# Patient Record
Sex: FEMALE | Race: WHITE | Employment: FULL TIME | URBAN - METROPOLITAN AREA
[De-identification: names, ages, dates, MRNs, and addresses within clinical notes are randomized per-mention and may not be internally consistent; named-entity substitution may affect disease eponyms.]

---

## 2020-01-21 ENCOUNTER — APPOINTMENT (OUTPATIENT)
Dept: PHYSICAL THERAPY | Age: 50
End: 2020-01-21

## 2020-01-24 ENCOUNTER — APPOINTMENT (OUTPATIENT)
Dept: PHYSICAL THERAPY | Age: 50
End: 2020-01-24

## 2020-01-27 ENCOUNTER — APPOINTMENT (OUTPATIENT)
Dept: PHYSICAL THERAPY | Age: 50
End: 2020-01-27

## 2020-01-29 ENCOUNTER — APPOINTMENT (OUTPATIENT)
Dept: PHYSICAL THERAPY | Age: 50
End: 2020-01-29

## 2020-02-03 ENCOUNTER — HOSPITAL ENCOUNTER (OUTPATIENT)
Dept: PHYSICAL THERAPY | Age: 50
Discharge: HOME | End: 2020-02-03
Payer: COMMERCIAL

## 2020-02-03 PROCEDURE — 97161 PT EVAL LOW COMPLEX 20 MIN: CPT

## 2020-02-03 PROCEDURE — 97140 MANUAL THERAPY 1/> REGIONS: CPT

## 2020-02-03 NOTE — THERAPY EVALUATION
Gaurav Montalvo  : 1970  Payor: [employer], [address] [place] [address] / Plan: [place] [employer] / Product Type: Commerical /  [place] at [facility] Therapy  [address], [address]  [phone]   [phone]         OUTPATIENT PHYSICAL [place] Assessment 2/3/2020   ICD-10: Treatment Diagnosis: right shoulder pain M25.511, Right elbow pain M25.521, Unspecified injury of right shoulder and upper arm, subsequent encounter [S49.91XD]  Precautions/Allergies:   Patient has no allergy information on record. TREATMENT PLAN:  Effective Dates: 2/3/2020 TO 5/3/2020 (90 days). Frequency/Duration: 2 times a week for 90 Day(s) and upon reassessment, will adjust frequency and duration as progress indicates. MEDICAL/REFERRING DIAGNOSIS:  Unspecified injury of right shoulder and upper arm, subsequent encounter [S49.91XD]   DATE OF ONSET: surgery 2020  REFERRING PHYSICIAN: Les Eubanks MD MD Orders: Eval and treat per protocol in paper chart  Return MD Appointment: unknown at this time     INITIAL ASSESSMENT:  Ms. Sal Kim presents status post right shoulder surgery on 2020. She reported originally injuring the shoulder in July as she fell of sidewalk. She attempted to treat the arm at home and then with formal physical therapy and injections. The shoulder did not improve and she underwent x-ray and MRI towards end of November. She was diagnosed with fracture, biceps tear, labral tear and assuming a cuff tear although pt was not very clear on the cuff tear. She underwent surgery on 2020 and is now ready to begin therapy. She is in sling until 2020. She presented today with mild soreness and loss of passive and active motion of the shoulder. PROBLEM LIST (Impacting functional limitations):  1. Decreased Strength  2. Decreased ADL/Functional Activities  3. Increased Pain  4.  Decreased Flexibility/Joint Mobility INTERVENTIONS PLANNED: (Treatment may consist of any combination of the following)  1. Cold  2. Home Exercise Program (HEP)  3. Manual Therapy  4. Range of Motion (ROM)  5. Therapeutic Exercise/Strengthening     GOALS: (Goals have been discussed and agreed upon with patient.)  Short-Term Functional Goals: Time Frame: 6 weeks  1. Establish independent HEP with no cuing. 2. Pt will be able to d/c sling. 3. Pt will be able to sleep entire night in her bed. 4. Pt will be able to return to work. Discharge Goals: Time Frame: 12 weeks (90 days)  1. Pt will be able to improve score on DASH by at least 6 points for advanced ADL's.  2. Pt will be able to gain full active elbow and shoulder ROM for reaching. 3. Pt will be able to gain 4/5 to 4+/5 strength for lifting groceries and all household objects with arm. OUTCOME MEASURE:   Tool Used: Disabilities of the Arm, Shoulder and Hand (DASH) Questionnaire - Quick Version  Score:  Initial: 30/55  Most Recent: X/55 (Date: -- )   Interpretation of Score: The DASH is designed to measure the activities of daily living in person's with upper extremity dysfunction or pain. Each section is scored on a 1-5 scale, 5 representing the greatest disability. The scores of each section are added together for a total score of 55. MEDICAL NECESSITY:   · Patient is expected to demonstrate progress in strength and range of motion to improve her overall ability to  use the right arm for reaching and lifitng and pushing of vacuum. .  REASON FOR SERVICES/OTHER COMMENTS:  · Patient continues to require skilled intervention due to lack of full elbow and shoulder ROM and strength needed for reaching, lifiting, grooming and household chores. Total Duration:  PT Patient Time In/Time Out  Time In: 0950  Time Out: 1040    Rehabilitation Potential For Stated Goals: Good  Regarding Marci Villgeas's therapy, I certify that the treatment plan above will be carried out by a therapist or under their direction.   Thank you for this referral,  Isrrael Frazier, PT     Referring Physician Signature: Razia Pascual MD No Signature is Required for this note. PAIN/SUBJECTIVE:   Initial: Pain Intensity 1: 2  Post Session:  2/10   HISTORY:   History of Injury/Illness (Reason for Referral):  Ms. Lucy Wakefield presents status post right shoulder surgery on 1/8/2020. She reported originally injuring the shoulder in July as she fell of sidewalk. She attempted to treat the arm at home and then with formal physical therapy and injections. The shoulder did not improve and she underwent x-ray and MRI towards end of November. She was diagnosed with fracture, biceps tear, labral tear and assuming a cuff tear although pt was not very clear on the cuff tear. She underwent surgery on 1/8/2020 and is now ready to begin therapy. She is in sling until 2/8/2020. She presented today with mild soreness and loss of passive and active motion of the shoulder. Past Medical History/Comorbidities:   Ms. Lucy Wakefield  has no past medical history on file. Ms. Lucy Wakefield  has no past surgical history on file.gallbladder removal, carpal tunnel surgery  Social History/Living Environment:     pt lives alone- roughly 3 years ago  Prior Level of Function/Work/Activity:  Pt works at computer during the day in [address] [place] [address] Street Side:         RIGHT   Ambulatory/Rehab [facility] Assessment   Risk Factors:       No Risk Factors Identified Ability to Rise from Chair:       (0)  Ability to rise in a single movement   Falls Prevention Plan:       No modifications necessary   Total: (5 or greater = High Risk): 0   ©2010 [facility] [address] [place] [address] Patent #[id number]. Federal Law prohibits the replication, distribution or use without written permission from [facility]   Current Medications:     No current outpatient medications on file.    Date Last Reviewed:  2/3/2020   Number of Personal Factors/Comorbidities that affect the Plan of Care: 1-2: MODERATE COMPLEXITY        EXAMINATION:   Palpation:          Raised lateral portal on shoulder-all incisions are healed  ROM:     R UE -PROM    Flex 140 °  abd 100 °  ER 30 °  IR 56 °    Passive elbow motion - °  Pronation WFL  Supination 40 °                    L UE-AROM  Flex 146 °  abd 160 °  ER 50 °  IR 75 °    Active elbow motion 0-146 °  Pronation WFL  Supination 60 °           Strength:     R UE deferred MMT due to surgery         L UE 4+/5            Special Tests: na  Neurological Screen: na  Balance:  good      Body Structures Involved:  1. Bones  2. Joints  3. Muscles  4. Ligaments Body Functions Affected:  1. Sensory/Pain  2. Neuromusculoskeletal  3. Movement Related Activities and Participation Affected:  1. General Tasks and Demands  2. Self Care  3. Domestic Life  4. Interpersonal Interactions and Relationships  5.  Community, Social and [place]   Number of elements (examined above) that affect the Plan of Care: 4+: HIGH COMPLEXITY   CLINICAL PRESENTATION:   Presentation: Stable and uncomplicated: LOW COMPLEXITY   CLINICAL DECISION MAKING:   Use of outcome tool(s) and clinical judgement create a POC that gives a: Clear prediction of patient's progress: LOW COMPLEXITY

## 2020-02-03 NOTE — PROGRESS NOTES
Sara Jeffries  : 1970  Payor: Ines Helton / Plan: [place] [employer] / Product Type: Commerical /  [address]  at [place] [address] Therapy  [address], [address]  [phone]   [phone]      OUTPATIENT PHYSICAL THERAPY: Daily Treatment Note 2/3/2020  Visit Count:  1  ICD-10: Treatment Diagnosis: right shoulder pain M25.511, Right elbow pain M25.521, Unspecified injury of right shoulder and upper arm, subsequent encounter [S49.91XD]    Pre-treatment Symptoms/Complaints:  Pt reporting feeling mild soreness. She is compliant with sling use. Pain: Initial: Pain Intensity 1: 2  Post Session:  2/10   Medications Last Reviewed:  2/3/2020  Updated Objective Findings:  See evaluation note from today   TREATMENT:   THERAPEUTIC EXERCISE: (0 minutes):  Exercises per grid below to improve mobility. Required minimal verbal cues to promote proper body mechanics. Progressed resistance, range and repetitions as indicated. na  Manual Therapy (   25 min   ): passive range of motion to elbow and shoulder with pt in supine. Light STM to scars and to biceps/shoulder region    Therapeutic Modalities (na     ):na    Evaluation (x)    HEP: As above; handouts given to patient for all exercises. Treatment/Session Summary:    · Response to Treatment:  Pt seen for eval today. She is in need of passive motion as she is in protective phase of her recovery. She will  be able to begin active assisted motion soon. She has been compliant with sling use and will benefit from passive motion followed by active motion and strengthening when appropriate. .  · Communication/Consultation:  None today  · Equipment provided today:  None today  · Recommendations/Intent for next treatment session: Next visit will focus on passive motion to shoulder and elbow.   Treatment Plan of Care Effective Dates:  2/3/2020 to 5/3/2020  Total Treatment Billable Duration:  carmen plus 25 min  PT Patient Time In/Time Out  Time In: 8814  Time Out: Elie 65, PT    Future Appointments   Date Time Provider Kalee Montano   2/6/2020  9:45 AM Darol Radha, PT [facility]   2/10/2020  9:45 AM Darol Radha, PT [facility]   2/13/2020  9:45 AM Darol Radha, PT [facility]   2/17/2020  9:45 AM Darol Radha, PT [facility]   2/20/2020  9:45 AM Darol Radha, PT [facility]   2/24/2020  9:45 AM Darol Radha, PT [facility]   2/27/2020  9:45 AM Darol Radha, PT [facility]   3/2/2020  9:45 AM Darol Radha, PT [facility]   3/5/2020  9:45 AM Darol Radha, PT [facility]   3/9/2020  9:45 AM Darol Radha, PT [facility]   3/12/2020  9:45 AM Darol Radha, PT [facility]

## 2020-02-06 ENCOUNTER — HOSPITAL ENCOUNTER (OUTPATIENT)
Dept: PHYSICAL THERAPY | Age: 50
Discharge: HOME | End: 2020-02-06
Payer: COMMERCIAL

## 2020-02-06 NOTE — PROGRESS NOTES
Therapy Center at [facility] Therapy   [address], [facility] [address]  [phone]   [phone]    OUTPATIENT DAILY NOTE    NAME/AGE/GENDER: Trisha Vaughan is a 44 y.o. female. DATE: 2/6/2020    Patient called to cancel appointment today. Will plan to follow up on next scheduled visit.     Kourtney Heredia, PT

## 2020-02-10 ENCOUNTER — HOSPITAL ENCOUNTER (OUTPATIENT)
Dept: PHYSICAL THERAPY | Age: 50
Discharge: HOME | End: 2020-02-10
Payer: COMMERCIAL

## 2020-02-10 PROCEDURE — 97140 MANUAL THERAPY 1/> REGIONS: CPT

## 2020-02-10 NOTE — PROGRESS NOTES
Ran García  : 1970  Payor: Ovidio Reina / Plan: [place] [employer] / Product Type: Commerical /  [address]  at [facility] Therapy  [address], [facility] [address]  [phone]   [phone]      OUTPATIENT PHYSICAL THERAPY: Daily Treatment Note 2/10/2020  Visit Count:  2  ICD-10: Treatment Diagnosis: right shoulder pain M25.511, Right elbow pain M25.521, Unspecified injury of right shoulder and upper arm, subsequent encounter [S49.91XD]    Pre-treatment Symptoms/Complaints:  Patient reports shoulder is still sore, but doing ok. Pain: Initial: Pain Intensity 1: 2  Post Session:  2/10   Medications Last Reviewed:  2/10/2020  Updated Objective Findings:  None Today   TREATMENT:   THERAPEUTIC EXERCISE: (0 minutes):  Exercises per grid below to improve mobility. Required minimal verbal cues to promote proper body mechanics. Progressed resistance, range and repetitions as indicated. na  Manual Therapy (   45 min   ): passive range of motion to elbow and shoulder with pt in supine. Light STM to scars and to biceps/shoulder region    Therapeutic Modalities (na     ):na        HEP: As directed. Treatment/Session Summary:    · Response to Treatment:  Patient tolerated treatment with mild discomfort today. Patient indicates she is scheduled to come out of the brace on Friday. Patient indicates that she has been able to sleep a couple of hours in the bed now. Instructed patient to continue home exercises as directed. · Communication/Consultation:  None today  · Equipment provided today:  None today  · Recommendations/Intent for next treatment session: Next visit will focus on passive motion to shoulder and elbow.   Treatment Plan of Care Effective Dates:  2/3/2020 to 5/3/2020  Total Treatment Billable Duration:  45 min  PT Patient Time In/Time Out  Time In: 0950  Time Out: [place], [place]    Future Appointments   Date Time Provider Department Center   2/13/2020  9:45 AM Gregor Rung, PT [facility]   2/17/2020  9:45 AM Gregor Rung, PT [facility]   2/20/2020  9:45 AM Gregor Rung, PT [facility]   2/24/2020  9:45 AM Gregor Rung, PT [facility]   2/27/2020  9:45 AM Gregor Rung, PT [facility]   3/2/2020  9:45 AM Gregor Rung, PT [facility]   3/5/2020  9:45 AM Gregor Rung, PT [facility]   3/9/2020  9:45 AM Gregor Rung, PT [facility]   3/12/2020  9:45 AM Gregor Rung, PT [facility]

## 2020-02-13 ENCOUNTER — HOSPITAL ENCOUNTER (OUTPATIENT)
Dept: PHYSICAL THERAPY | Age: 50
Discharge: HOME | End: 2020-02-13
Payer: COMMERCIAL

## 2020-02-13 PROCEDURE — 97110 THERAPEUTIC EXERCISES: CPT

## 2020-02-13 PROCEDURE — 97140 MANUAL THERAPY 1/> REGIONS: CPT

## 2020-02-13 NOTE — PROGRESS NOTES
Adelaida Perez  : 1970  Payor: Zoë Hyatt / Plan: [place] [employer] / Product Type: Commerical /  [address]  at [address], [facility] [address]  [phone]   [phone]      OUTPATIENT PHYSICAL THERAPY: Daily Treatment Note 2020  Visit Count:  3  ICD-10: Treatment Diagnosis: right shoulder pain M25.511, Right elbow pain M25.521, Unspecified injury of right shoulder and upper arm, subsequent encounter [S49.91XD]    Pre-treatment Symptoms/Complaints:  Patient reports there is a dull ache in the shoulder. Pain: Initial: Pain Intensity 1: 2  Post Session:  2/10   Medications Last Reviewed:  2020  Updated Objective Findings:  None Today   TREATMENT:   THERAPEUTIC EXERCISE: (19 minutes):  Exercises per grid below to improve mobility. Required minimal verbal cues to promote proper body mechanics. Progressed resistance, range and repetitions as indicated. Supine t-bar flexion x 15  Biceps curls with t-bar x 20  Supine t-bar ER x 20  Pulleys x 30  Manual Therapy (   25 min   ): passive range of motion to elbow and shoulder with pt in supine. Light STM to scars and to biceps/shoulder region    Therapeutic Modalities (na     ):na        HEP: As directed. Treatment/Session Summary:    · Response to Treatment:  Patient reporting no increased pain. She was able to begin progression to active assisted and active motion of shoulder and elbow. She was instructed in t-bar flexion and ER in supine position and pulleys. She will begin performing these at home. She did not have any increased soreness and did not push into pain with these exercises.     · Communication/Consultation:  None today  · Equipment provided today:  None today  · Recommendations/Intent for next treatment session: Next visit will focus on passive motion to shoulder and elbow and active to active assisted motion  Treatment Plan of Care Effective Dates:  2/3/2020 to 5/3/2020  Total Treatment Billable Duration:  44 min  PT Patient Time In/Time Out  Time In: 0945  Time Out: [address], PT,     Future Appointments   Date Time Provider Kalee Montano   2/17/2020  9:45 AM [facility]   2/20/2020  9:45 AM Edwardo Bouquet, PT [facility]   2/24/2020  9:45 AM Edwardo Bouquet, PT [facility]   2/27/2020  9:45 AM Edwardo Bouquet, PT [facility]   3/2/2020  9:45 AM Edwardo Bouquet, PT [facility]   3/5/2020  9:45 AM Edwardo Bouquet, PT [facility]   3/9/2020  9:45 AM Edwardo Bouquet, PT [facility]   3/12/2020  9:45 AM [facility]

## 2020-02-17 ENCOUNTER — HOSPITAL ENCOUNTER (OUTPATIENT)
Dept: PHYSICAL THERAPY | Age: 50
Discharge: HOME | End: 2020-02-17
Payer: COMMERCIAL

## 2020-02-17 PROCEDURE — 97140 MANUAL THERAPY 1/> REGIONS: CPT

## 2020-02-17 PROCEDURE — 97110 THERAPEUTIC EXERCISES: CPT

## 2020-02-17 NOTE — PROGRESS NOTES
Marlene Ballesteros  : 1970  Payor: Maira Unger / Plan: [place] [employer] / Product Type: Commerical /  [address]  at [facility] Therapy  [address], [address]  [phone]   [phone]      OUTPATIENT PHYSICAL THERAPY: Daily Treatment Note 2020  Visit Count:  4  ICD-10: Treatment Diagnosis: right shoulder pain M25.511, Right elbow pain M25.521, Unspecified injury of right shoulder and upper arm, subsequent encounter [S49.91XD]    Pre-treatment Symptoms/Complaints:  Patient reports she is not out of sling. Mild dull ache felt times  Pain: Initial: Pain Intensity 1: 2  Post Session:  2/10   Medications Last Reviewed:  2020  Updated Objective Findings:  None Today   TREATMENT:   THERAPEUTIC EXERCISE: (20 minutes):  Exercises per grid below to improve mobility. Required minimal verbal cues to promote proper body mechanics. Progressed resistance, range and repetitions as indicated. Supine t-bar modified bench press x 30  Biceps curls with t-bar x 30  Supine t-bar ER x 20  Pulleys x 30  UBE x 5 1/2 min  Wall washing x 20  Manual Therapy (   24 min   ): passive range of motion to elbow and shoulder with pt in supine. Light STM to scars and to biceps/shoulder region    Therapeutic Modalities (na     ):na        HEP: As directed. Treatment/Session Summary:    · Response to Treatment:  Patient reporting no increased pain. She is out of sling and is using arm for light ADL's like dressing, bathing, eating. She will be returning to work on Wednesday. She is still in slight protective phase and can not load the arm yet. Will be able to progress to this in the next 1-2 weeks.    · Communication/Consultation:  None today  · Equipment provided today:  None today  · Recommendations/Intent for next treatment session: Next visit will focus on passive motion to shoulder and elbow and active to active assisted motion  Treatment Plan of Care Effective Dates:  2/3/2020 to 5/3/2020  Total Treatment Billable Duration:  44 min  PT Patient Time In/Time Out  Time In: 0945  Time Out: [address], PT,     Future Appointments   Date Time Provider Kalee Montano   2/20/2020  9:45 AM Dannielle Gaffney, PT [facility]   2/24/2020  9:45 AM Dannielle Gaffney, PT [facility]   2/27/2020  9:45 AM Dannielle Gaffney, PT [facility]   3/2/2020  9:45 AM Dannielle Gaffney, PT [facility]   3/5/2020  9:45 AM Dannielle Gaffney, PT [facility]   3/9/2020  9:45 AM Dannielle Gaffney, PT [facility]   3/12/2020  9:45 AM Dannielle Gaffney, PT [facility]

## 2020-02-20 ENCOUNTER — HOSPITAL ENCOUNTER (OUTPATIENT)
Dept: PHYSICAL THERAPY | Age: 50
Discharge: HOME | End: 2020-02-20
Payer: COMMERCIAL

## 2020-02-20 PROCEDURE — 97110 THERAPEUTIC EXERCISES: CPT

## 2020-02-20 PROCEDURE — 97140 MANUAL THERAPY 1/> REGIONS: CPT

## 2020-02-20 NOTE — PROGRESS NOTES
Argelia Severe  : 1970  Payor: Marla Gurrola / Plan: [place] [employer] / Product Type: Commerical /  [address] [place]  at [address]  [address], [address]  [phone]   [phone]      OUTPATIENT PHYSICAL THERAPY: Daily Treatment Note 2020  Visit Count:  5  ICD-10: Treatment Diagnosis: right shoulder pain M25.511, Right elbow pain M25.521, Unspecified injury of right shoulder and upper arm, subsequent encounter [S49.91XD]    Pre-treatment Symptoms/Complaints:  Patient reports she has returned to work this week and feels okay thus far. Pain: Initial: Pain Intensity 1: 2  Post Session:  2/10   Medications Last Reviewed:  2020  Updated Objective Findings:  None Today   TREATMENT:   THERAPEUTIC EXERCISE: (20 minutes):  Exercises per grid below to improve mobility. Required minimal verbal cues to promote proper body mechanics. Progressed resistance, range and repetitions as indicated. Supine t-bar modified bench press x 30-held  Biceps curls with t-bar x 30  Supine t-bar ER x 20  Pulleys x 30  UBE x 5 1/2 min-held  Nu-step level 3 x 7 min  Wall washing x 30  Manual Therapy (   24 min   ): passive range of motion to elbow and shoulder with pt in supine. Light STM to scars and to biceps/shoulder region    Therapeutic Modalities (na     ):na        HEP: As directed. Treatment/Session Summary:    · Response to Treatment:  Patient reporting no increased pain with treatment. She has been able to return to work with no adverse effects on the shoulder or elbow. Pt will begin using glider at home for up to 15 minutes as long as she does not have increased pain in the shoulder or elbow. She is progressing well with ROM to both regions.   · Communication/Consultation:  None today  · Equipment provided today:  None today  · Recommendations/Intent for next treatment session: Next visit will focus on passive motion to shoulder and elbow and active to active assisted motion.     Treatment Plan of Care Effective Dates:  2/3/2020 to 5/3/2020  Total Treatment Billable Duration:  44 min  PT Patient Time In/Time Out  Time In: 0945  Time Out: [address] PT,     Future Appointments   Date Time Provider Kalee Montano   2/24/2020  9:45 AM Rayfield San German, PT [facility]   2/27/2020  9:45 AM Rayfield San German, PT [facility]   3/2/2020  9:45 AM Rayfield San German, PT [facility]   3/5/2020  9:45 AM Rayfield San German, PT [facility]   3/9/2020  9:45 AM Rayfield San German, PT [facility]   3/12/2020  9:45 AM Rayfield San German, PT [facility]   3/16/2020  9:45 AM Rayfield San German, PT [facility]   3/19/2020  9:45 AM Rayfield San German, PT [facility]   3/23/2020  9:45 AM Rayfield San German, PT [facility]   3/26/2020  9:45 AM Rayfield San German, PT [facility]

## 2020-02-27 ENCOUNTER — HOSPITAL ENCOUNTER (OUTPATIENT)
Dept: PHYSICAL THERAPY | Age: 50
Discharge: HOME | End: 2020-02-27
Payer: COMMERCIAL

## 2020-02-27 PROCEDURE — 97110 THERAPEUTIC EXERCISES: CPT

## 2020-02-27 PROCEDURE — 97140 MANUAL THERAPY 1/> REGIONS: CPT

## 2020-02-27 NOTE — PROGRESS NOTES
Lena Gaviria  : 1970  Payor: Manasa Sarabia / Plan: [place] [employer] / Product Type: Comm[employer]ri /  [address] [place]  at [facility] Therapy  [address], [address]  [phone]   [phone]      OUTPATIENT PHYSICAL THERAPY: Daily Treatment Note 2020  Visit Count:  6  ICD-10: Treatment Diagnosis: right shoulder pain M25.511, Right elbow pain M25.521, Unspecified injury of right shoulder and upper arm, subsequent encounter [S49.91XD]    Pre-treatment Symptoms/Complaints:  Patient reports she came on wrong day earlier this week and apologized for this. She reports no increased pain today. Pain: Initial: Pain Intensity 1: 2  Post Session:  2/10   Medications Last Reviewed:  2020  Updated Objective Findings:  None Today   TREATMENT:   THERAPEUTIC EXERCISE: (29 minutes):  Exercises per grid below to improve mobility. Required minimal verbal cues to promote proper body mechanics. Progressed resistance, range and repetitions as indicated. Biceps curls 4# x 30  Sidelying ER 2# x 30  Sidelying abduction 2# x 30  Pulleys x 30  UBE x 5 1/2 min  Nu-step level 3 x 7 min-held  Wall washing x 30-held  IR green TB x 20  Rows green TB x 30  Manual Therapy (   20 min   ): passive range of motion to elbow and shoulder with pt in supine. Light STM to scars and to biceps/shoulder region    Therapeutic Modalities (na     ):na    HEP: As directed. Treatment/Session Summary:    · Response to Treatment:  Patient reporting no increased pain. We were able to advance to strengthening today. We added light weight biceps curls and light IR for shoulder and rows for shoulder. She also was able to perform some shoulder exercises in sidelying . She had no complaints with exercises today and we will continue to progress her so she can return to moderate lifting and pushing and pulling of objects.   · Communication/Consultation:  None today  · Equipment provided today:  None today  · Recommendations/Intent for next treatment session: Next visit will focus on passive motion to shoulder and elbow and active to active assisted motion.     Treatment Plan of Care Effective Dates:  2/3/2020 to 5/3/2020  Total Treatment Billable Duration:  49 min  PT Patient Time In/Time Out  Time In: 0945  Time Out: Henok Ackerman 55, PT    Future Appointments   Date Time Provider Kalee Montano   3/2/2020  9:45 AM Vanessa Rao, PT [facility]   3/5/2020  9:45 AM Vanessa Rao, PT [facility]   3/9/2020  9:45 AM Vanessa Rao, PT [facility]   3/12/2020  9:45 AM Vanessa Rao, PT [facility]   3/16/2020  9:45 AM Vanessa Rao, PT [facility]   3/19/2020  9:45 AM Vanessa Rao, PT [facility]   3/24/2020  9:45 AM Vanessa Rao, PT [facility]   3/26/2020  9:45 AM Vanessa Rao, PT [facility]

## 2020-03-02 ENCOUNTER — HOSPITAL ENCOUNTER (OUTPATIENT)
Dept: PHYSICAL THERAPY | Age: 50
Discharge: HOME | End: 2020-03-02
Payer: COMMERCIAL

## 2020-03-02 PROCEDURE — 97110 THERAPEUTIC EXERCISES: CPT

## 2020-03-02 PROCEDURE — 97140 MANUAL THERAPY 1/> REGIONS: CPT

## 2020-03-02 NOTE — PROGRESS NOTES
Kimsweta Gaviria  : 1970  Payor: Edward Silva / Plan: [facility] [employer] / Product Type: Commpreetl /  [address]  at [place] [address] Therapy  [address], [address]  [phone]   [phone]      OUTPATIENT PHYSICAL THERAPY: Daily Treatment Note 3/2/2020  Visit Count:  7  ICD-10: Treatment Diagnosis: right shoulder pain M25.511, Right elbow pain M25.521, Unspecified injury of right shoulder and upper arm, subsequent encounter [S49.91XD]    Pre-treatment Symptoms/Complaints:  Patient reports is progressing well. She saw surgeon and he was pleased with her progress. Pain: Initial: Pain Intensity 1: 2  Post Session:  2/10   Medications Last Reviewed:  3/2/2020  Updated Objective Findings:  None Today   TREATMENT:   THERAPEUTIC EXERCISE: (24 minutes):  Exercises per grid below to improve mobility. Required minimal verbal cues to promote proper body mechanics. Progressed resistance, range and repetitions as indicated. Biceps curls 5# x 30  Sidelying ER 3# x 30  Sidelying abduction 3# x 30  Pulleys x 30-held  UBE x 6 min  Nu-step level 3 x 7 min-held  Wall washing x 30-held  IR green TB x 25  ER red TB x 25  Rows green TB x 30  Punches red TB x 30  Scaption 2# x 25  Standing flexion to 90 2# x 25  Supine flexion 2# x 20  Manual Therapy (   15 min   ): passive range of motion to elbow and shoulder with pt in supine. Light STM to scars and to biceps/shoulder region    Therapeutic Modalities (na     ):na    HEP: As directed. Treatment/Session Summary:    · Response to Treatment:  Patient reporting no increased pain with treatment, but the arm did fatigue. She has been able to perform full work duties. She is avoiding any heavy lifting at home and still lacks full motion in all directions. Reaching fully overhead and behind her back is still difficult, but we are progressing towards this.   · Communication/Consultation:  None today  · Equipment provided today:  None today  · Recommendations/Intent for next treatment session: Next visit will focus on passive motion to shoulder and elbow and active to active assisted motion and strengthening.   Treatment Plan of Care Effective Dates:  2/3/2020 to 5/3/2020  Total Treatment Billable Duration:  39 min  PT Patient Time In/Time Out  Time In: 0945  Time Out: [address], PT    Future Appointments   Date Time Provider Kalee Montano   3/5/2020  9:45 AM Dannielle Girdwood, PT [facility]   3/9/2020  9:45 AM Dannielle Girdwood, PT [facility]   3/12/2020  9:45 AM Dannielle Girdwood, PT [facility]   3/16/2020  9:45 AM Dannielle Girdwood, PT [facility]   3/19/2020  9:45 AM Dannielle Girdwood, PT [facility]   3/24/2020  9:45 AM Dannielle Girdwood, PT [facility]   3/26/2020  9:45 AM Dannielle Girdwood, PT [facility]

## 2020-03-05 ENCOUNTER — HOSPITAL ENCOUNTER (OUTPATIENT)
Dept: PHYSICAL THERAPY | Age: 50
Discharge: HOME | End: 2020-03-05
Payer: COMMERCIAL

## 2020-03-05 PROCEDURE — 97140 MANUAL THERAPY 1/> REGIONS: CPT

## 2020-03-05 PROCEDURE — 97110 THERAPEUTIC EXERCISES: CPT

## 2020-03-09 ENCOUNTER — HOSPITAL ENCOUNTER (OUTPATIENT)
Dept: PHYSICAL THERAPY | Age: 50
Discharge: HOME | End: 2020-03-09
Payer: COMMERCIAL

## 2020-03-09 PROCEDURE — 97110 THERAPEUTIC EXERCISES: CPT

## 2020-03-09 PROCEDURE — 97140 MANUAL THERAPY 1/> REGIONS: CPT

## 2020-03-09 NOTE — PROGRESS NOTES
Vidhi Gillespie  : 1970  Payor: Ila Foster / [place] [employer] / Product Type: Commerical /  [address] [place]  at [place] [address] Therapy  [address], [address]  [phone]   [phone]      OUTPATIENT PHYSICAL THERAPY: Daily Treatment Note 3/9/2020  Visit Count:  9  ICD-10: Treatment Diagnosis: right shoulder pain M25.511, Right elbow pain M25.521, Unspecified injury of right shoulder and upper arm, subsequent encounter [S49.91XD]    Pre-treatment Symptoms/Complaints:  Patient reports shoulder feels about the same. She states it's moving a little better. Pain: Initial: Pain Intensity 1: 2  Post Session:  1/10   Medications Last Reviewed:  3/9/2020  Updated Objective Findings:  None Today   TREATMENT:   THERAPEUTIC EXERCISE: (40 minutes):  Exercises per grid below to improve mobility. Required minimal verbal cues to promote proper body mechanics. Progressed resistance, range and repetitions as indicated. Biceps curls 5# x 30  Sidelying ER 3# x 30  Sidelying abduction 3# x 30  Pulleys x 30  UBE x 7 min  Nu-step level 3 x 7 min  Wall washing x 30-held  IR green TB x 25  ER green TB x 25  Rows green TB x 30  Punches red TB x 30  Scaption 2# x 25  Standing flexion to 90 2# x 25  Supine flexion 2# x 20  Prone ext 4# x 30  Prone h. abd x 10  Prone h. abd with eccentric lowering x 10  Manual Therapy (   15 min   ): passive range of motion to elbow and shoulder with pt in supine. Light STM to scars and to biceps/shoulder region    Therapeutic Modalities (na     ):na    HEP: As directed. Treatment/Session Summary:    · Response to Treatment:  Patient tolerated treatment well today. She demonstrated good effort with all exercise. Patient seems pleased with her progress indicating she can tell a difference in the last few days of how good her shoulder is moving. Still needs to work on ROM especially at the end range.  Good motivation and compliance with home exercises. · Communication/Consultation:  None today  · Equipment provided today:  None today  · Recommendations/Intent for next treatment session: Next visit will focus on strengthening and manual therapy.   Treatment Plan of Care Effective Dates:  2/3/2020 to 5/3/2020  Total Treatment Billable Duration:  55 min  PT Patient Time In/Time Out  Time In: 1000  Time Out: [address], [place]    Future Appointments   Date Time Provider Kalee Montano   3/12/2020  9:45 AM Oc North, PT [facility]   3/16/2020  9:45 AM Oc North, PT [facility]   3/19/2020  9:45 AM Oc North, PT [facility]   3/24/2020  9:45 AM Oc North, PT [facility]   3/26/2020  9:45 AM Oc [place], PT [facility]   4/2/2020  9:45 AM Oc [place], PT [facility]   4/6/2020  9:45 AM Oc [place], PT [facility]   4/9/2020 10:00 AM Skylar Moreira [facility]   4/13/2020 10:00 AM Scio Lillie [facility]   4/16/2020 10:00 AM Skylar Moreira [facility]   4/20/2020  9:45 AM Oc North, PT [facility]   4/23/2020  9:45 AM Oc [place], PT [facility]   4/27/2020  9:45 AM Oc [place], PT [facility]   4/30/2020  9:45 AM Oc [place], PT [facility]

## 2020-03-12 ENCOUNTER — HOSPITAL ENCOUNTER (OUTPATIENT)
Dept: PHYSICAL THERAPY | Age: 50
Discharge: HOME | End: 2020-03-12
Payer: COMMERCIAL

## 2020-03-12 PROCEDURE — 97140 MANUAL THERAPY 1/> REGIONS: CPT

## 2020-03-12 PROCEDURE — 97110 THERAPEUTIC EXERCISES: CPT

## 2020-03-12 NOTE — PROGRESS NOTES
Vita Rubio  : 1970  Payor: Dominique Palencia / Plan: [place] [facility] [employer] / Product Type: Commerical /  [address] [place]  at [facility] Therapy  [address], [address]  [phone]   [phone]         OUTPATIENT PHYSICAL THERAPY:Progress Report 3/12/2020   ICD-10: Treatment Diagnosis: right shoulder pain M25.511, Right elbow pain M25.521, Unspecified injury of right shoulder and upper arm, subsequent encounter [S49.91XD]  Precautions/Allergies:   Patient has no allergy information on record. TREATMENT PLAN:  Effective Dates: 2/3/2020 TO 5/3/2020 (90 days). Frequency/Duration: 2 times a week for 90 Day(s) and upon reassessment, will adjust frequency and duration as progress indicates. MEDICAL/REFERRING DIAGNOSIS:  Unspecified injury of right shoulder and upper arm, subsequent encounter [S49.91XD]   DATE OF ONSET: surgery 2020  REFERRING PHYSICIAN: Saira Pillai MD MD Orders: Eval and treat per protocol in paper chart  Return MD Appointment: unknown at this time     INITIAL ASSESSMENT:  Ms. Dodie Merida presents status post right shoulder surgery on 2020. She reported originally injuring the shoulder in July as she fell of sidewalk. She attempted to treat the arm at home and then with formal physical therapy and injections. The shoulder did not improve and she underwent x-ray and MRI towards end of November. She was diagnosed with fracture, biceps tear, labral tear and assuming a cuff tear although pt was not very clear on the cuff tear. She underwent surgery on 2020 and is now ready to begin therapy. She is in sling until 2020. She presented today with mild soreness and loss of passive and active motion of the shoulder. Assessment as of 3/12/2020:  Pt reporting mild pain and soreness that is intermittent in nature. She has the following supine AROM:  Flex 165 °  abd 160 °, ER 70 °, IR 68 °.   She has the following strength levels: flex 4-/5, abd 4-/5, ER 4-/5, IR 4/5, biceps 4-/5. She continues to be advanced with reps and increased resistance to help her meet her goals of using the arm unrestricted for all ADL's and hobbies. PROBLEM LIST (Impacting functional limitations):  1. Decreased Strength  2. Decreased ADL/Functional Activities  3. Increased Pain  4. Decreased Flexibility/Joint Mobility INTERVENTIONS PLANNED: (Treatment may consist of any combination of the following)  1. Cold  2. Home Exercise Program (HEP)  3. Manual Therapy  4. Range of Motion (ROM)  5. Therapeutic Exercise/Strengthening     GOALS: (Goals have been discussed and agreed upon with patient.)  Short-Term Functional Goals: Time Frame: 6 weeks  1. Establish independent HEP with no cuing.-met  2. Pt will be able to d/c sling.-met  3. Pt will be able to sleep entire night in her bed.-met  4. Pt will be able to return to work.-met  Discharge Goals: Time Frame: 12 weeks (90 days)  1. Pt will be able to improve score on DASH by at least 6 points for advanced ADL's.-met  2. Pt will be able to gain full active elbow and shoulder ROM for reaching.-in progress  3. Pt will be able to gain 4/5 to 4+/5 strength for lifting groceries and all household objects with arm.-in progress    OUTCOME MEASURE:   Tool Used: Disabilities of the Arm, Shoulder and Hand (DASH) Questionnaire - Quick Version  Score:  Initial: 30/55  Most Recent:23/55 (Date: 3/12/2020 )   Interpretation of Score: The DASH is designed to measure the activities of daily living in person's with upper extremity dysfunction or pain. Each section is scored on a 1-5 scale, 5 representing the greatest disability. The scores of each section are added together for a total score of 55. MEDICAL NECESSITY:   · Patient is expected to demonstrate progress in strength and range of motion to improve her overall ability to  use the right arm for reaching and lifitng and pushing of vacuum. .  REASON FOR SERVICES/OTHER COMMENTS:  · Patient continues to require skilled intervention due to lack of full elbow and shoulder ROM and strength needed for reaching, lifiting, grooming and household chores. Total Duration:  PT Patient Time In/Time Out  Time In: 0945  Time Out: 1030    Rehabilitation Potential For Stated Goals: Good  Regarding Issa Villegas's therapy, I certify that the treatment plan above will be carried out by a therapist or under their direction.   Thank you for this referral,  Yary Downey, PT

## 2020-03-12 NOTE — PROGRESS NOTES
Sandi Castillo  : 1970  Payor: Kimberly Zamudio / Plan[place] [employer] / Product Type: Commerical /  [address] [place]  at [facility] Therapy  [address], [address]  [phone]   [phone]      OUTPATIENT PHYSICAL THERAPY: Daily Treatment Note 3/12/2020  Visit Count:  10  ICD-10: Treatment Diagnosis: right shoulder pain M25.511, Right elbow pain M25.521, Unspecified injury of right shoulder and upper arm, subsequent encounter [S49.91XD]    Pre-treatment Symptoms/Complaints:  Patient reports shoulder feels a little sore today. She did not sleep well last night. Pain: Initial: Pain Intensity 1: 3  Post Session:  2/10   Medications Last Reviewed:  3/12/2020  Updated Objective Findings:  see progress note   TREATMENT:   THERAPEUTIC EXERCISE: (29 minutes):  Exercises per grid below to improve mobility. Required minimal verbal cues to promote proper body mechanics. Progressed resistance, range and repetitions as indicated. Biceps curls double arm 30# x 30  Sidelying ER 3# x 30-held  Sidelying abduction 3# x 30-held  Pulleys for IR  x 20  UBE x 7 min-held  Nu-step level 3 x 10 min  Wall washing x 30-held  IR green TB x 30  ER blue TB x 30  Rows green TB x 30  Standing shoulder flexion green TB x 20  Scaption 3# x 25  Standing flexion to 90 3# x 25  Supine flexion 2# x 20-held  Prone ext 4# x 30-held  Prone h. abd x 10-held  Prone h. abd with eccentric lowering x 10-held  Manual Therapy (   15 min   ): passive range of motion to elbow and shoulder with pt in supine. Light STM to scars and to biceps/shoulder region    Therapeutic Modalities (na     ):na    HEP: As directed. Treatment/Session Summary:    · Response to Treatment:  Patient reporting some increased pain last night and into this morning. The shoulder does fatigue with exercises. IR remains limited and pt has decreased ability to reach her arm behind her back.  She is progressing well with all other motions and strengthening. · Communication/Consultation:  None today  · Equipment provided today:  None today  · Recommendations/Intent for next treatment session: Next visit will focus on strengthening and manual therapy.   Treatment Plan of Care Effective Dates:  2/3/2020 to 5/3/2020  Total Treatment Billable Duration:  44 min  PT Patient Time In/Time Out  Time In: 0945  Time Out: [address], PT    Future Appointments   Date Time Provider Kalee Montano   3/16/2020  9:45 AM Soto Seen, PT [facility]   3/19/2020  9:45 AM Soto Seen, PT [facility]   3/24/2020  9:45 AM Soto Seen, PT [facility]   3/26/2020  9:45 AM Soto Seen, PT [facility]   4/2/2020  9:45 AM Soto Seen, PT [facility]   4/6/2020  9:45 AM Soot Seen, PT [facility]   4/9/2020 10:00 AM Angelika Kumar [facility]   4/13/2020 10:00 AM Angelika Kumar [facility]   4/16/2020 10:00 AM Angelika [facility]   4/20/2020  9:45 AM Soto Seen, PT [facility]   4/23/2020  9:45 AM Soto Seen, PT [facility]   4/27/2020  9:45 AM Soto Seen, PT [facility]   4/30/2020  9:45 AM Soto Seen, PT [facility]

## 2020-03-16 ENCOUNTER — HOSPITAL ENCOUNTER (OUTPATIENT)
Dept: PHYSICAL THERAPY | Age: 50
Discharge: HOME | End: 2020-03-16
Payer: COMMERCIAL

## 2020-03-16 PROCEDURE — 97140 MANUAL THERAPY 1/> REGIONS: CPT

## 2020-03-16 PROCEDURE — 97110 THERAPEUTIC EXERCISES: CPT

## 2020-03-16 NOTE — PROGRESS NOTES
Vidhi Gillespie  : 1970  Payor: Ila Foster / Plan: [place] [employer] / Product Type: Commpreetl /  [address] [place]  at [place] [address] Therapy  [address], [address]  [phone]   [phone]      OUTPATIENT PHYSICAL THERAPY: Daily Treatment Note 3/16/2020  Visit Count:  11  ICD-10: Treatment Diagnosis: right shoulder pain M25.511, Right elbow pain M25.521, Unspecified injury of right shoulder and upper arm, subsequent encounter [S49.91XD]    Pre-treatment Symptoms/Complaints:  Patient reports shoulder is feeling okay. She was able to work in the yard some using the  Left arm primarily though. Pain: Initial: Pain Intensity 1: 3  Post Session:  2/10   Medications Last Reviewed:  3/16/2020  Updated Objective Findings:  None Today   TREATMENT:   THERAPEUTIC EXERCISE: (29 minutes):  Exercises per grid below to improve mobility. Required minimal verbal cues to promote proper body mechanics. Progressed resistance, range and repetitions as indicated. Biceps curls double arm 30# x 30  Sidelying ER 3# x 30  Sidelying abduction 3# x 30-held  Pulleys for IR  x 20-held  UBE x 6 min  Nu-step level 3 x 10 min-held  Wall washing x 30-held  IR 12#  x 25  ER blue TB x 30  Rows 20 x 30  Standing shoulder flexion green TB x 20-held  Scaption 3# x 25  Standing flexion to 90 3# x 25  Supine flexion 2# x 20-held  Prone ext 5# x 25  Prone h. abd x 10-held  Prone h. abd with eccentric lowering x 10-held  Bent over rows 7# x 20  Manual Therapy (   20 min   ): passive range of motion to elbow and shoulder with pt in supine. Light STM to scars and to biceps/shoulder region    Therapeutic Modalities (na     ):na    HEP: As directed. Treatment/Session Summary:    · Response to Treatment:  Patient able to progress to cable resistance today for IR and standing rows.   She is progressing with strengthening and still requires light cuing to avoid substitution by upper trap. She continues to benefit from strengthening so she can return to all prior activities. · Communication/Consultation:  None today  · Equipment provided today:  None today  · Recommendations/Intent for next treatment session: Next visit will focus on strengthening and manual therapy.   Treatment Plan of Care Effective Dates:  2/3/2020 to 5/3/2020  Total Treatment Billable Duration:  49 min  PT Patient Time In/Time Out  Time In: 0945  Time Out: [address], PT    Future Appointments   Date Time Provider Kalee Montano   3/19/2020  9:45 AM Davie Orozco, PT [facility]   3/24/2020  9:45 AM Davie Orozco, PT [facility]   3/26/2020  9:45 AM Davie Orozco, PT [facility]   4/2/2020  9:45 AM Davie Orozco, PT [facility]   4/6/2020  9:45 AM Dvaie Orozco, PT [facility]   4/9/2020 10:00 AM Evertt Sprinkle [facility]   4/13/2020 10:00 AM [facility]   4/16/2020 10:00 AM [facility]   4/20/2020  9:45 AM Davie Orozco, PT [facility]   4/23/2020  9:45 AM Davie Orozco, PT [facility]   4/27/2020  9:45 AM Davie Orozco, PT [facility]   4/30/2020  9:45 AM Davie Oroczo, PT [facility]

## 2020-03-19 ENCOUNTER — APPOINTMENT (OUTPATIENT)
Dept: PHYSICAL THERAPY | Age: 50
End: 2020-03-19
Payer: COMMERCIAL

## 2020-03-24 ENCOUNTER — APPOINTMENT (OUTPATIENT)
Dept: PHYSICAL THERAPY | Age: 50
End: 2020-03-24
Payer: COMMERCIAL

## 2020-03-26 ENCOUNTER — APPOINTMENT (OUTPATIENT)
Dept: PHYSICAL THERAPY | Age: 50
End: 2020-03-26
Payer: COMMERCIAL

## 2020-03-30 ENCOUNTER — APPOINTMENT (OUTPATIENT)
Dept: PHYSICAL THERAPY | Age: 50
End: 2020-03-30
Payer: COMMERCIAL

## 2020-04-02 ENCOUNTER — HOSPITAL ENCOUNTER (OUTPATIENT)
Dept: PHYSICAL THERAPY | Age: 50
End: 2020-04-02

## 2020-04-06 ENCOUNTER — APPOINTMENT (OUTPATIENT)
Dept: PHYSICAL THERAPY | Age: 50
End: 2020-04-06

## 2020-04-09 ENCOUNTER — APPOINTMENT (OUTPATIENT)
Dept: PHYSICAL THERAPY | Age: 50
End: 2020-04-09

## 2020-04-13 ENCOUNTER — APPOINTMENT (OUTPATIENT)
Dept: PHYSICAL THERAPY | Age: 50
End: 2020-04-13

## 2020-04-16 ENCOUNTER — APPOINTMENT (OUTPATIENT)
Dept: PHYSICAL THERAPY | Age: 50
End: 2020-04-16

## 2020-04-20 ENCOUNTER — APPOINTMENT (OUTPATIENT)
Dept: PHYSICAL THERAPY | Age: 50
End: 2020-04-20

## 2020-04-23 ENCOUNTER — APPOINTMENT (OUTPATIENT)
Dept: PHYSICAL THERAPY | Age: 50
End: 2020-04-23

## 2020-04-27 ENCOUNTER — APPOINTMENT (OUTPATIENT)
Dept: PHYSICAL THERAPY | Age: 50
End: 2020-04-27

## 2020-04-30 ENCOUNTER — APPOINTMENT (OUTPATIENT)
Dept: PHYSICAL THERAPY | Age: 50
End: 2020-04-30

## 2020-08-13 NOTE — THERAPY DISCHARGE
Katherine Tariq  : 1970  Payor: Roselia Clearfield / [place]: [place] [employer] / Product Type: Commerical /  [address] [place]  at [facility] Therapy  [address], [address]  [phone]   [phone]         OUTPATIENT PHYSICAL THERAPY:Discontinuation Summary 2020   ICD-10: Treatment Diagnosis: right shoulder pain M25.511, Right elbow pain M25.521, Unspecified injury of right shoulder and upper arm, subsequent encounter [S49.91XD]  Precautions/Allergies:   Patient has no allergy information on record. TREATMENT PLAN:  Effective Dates: 2/3/2020 TO 5/3/2020 (90 days). Frequency/Duration: 2 times a week for 90 Day(s) and upon reassessment, will adjust frequency and duration as progress indicates. MEDICAL/REFERRING DIAGNOSIS:  Unspecified injury of right shoulder and upper arm, subsequent encounter [S49.91XD]   DATE OF ONSET: surgery 2020  REFERRING PHYSICIAN: Michael Venegas MD MD Orders: Eval and treat per protocol in paper chart  Return MD Appointment: unknown at this time     INITIAL ASSESSMENT:  Ms. Trae Mcqueen presents status post right shoulder surgery on 2020. She reported originally injuring the shoulder in July as she fell of sidewalk. She attempted to treat the arm at home and then with formal physical therapy and injections. The shoulder did not improve and she underwent x-ray and MRI towards end of November. She was diagnosed with fracture, biceps tear, labral tear and assuming a cuff tear although pt was not very clear on the cuff tear. She underwent surgery on 2020 and is now ready to begin therapy. She is in sling until 2020. She presented today with mild soreness and loss of passive and active motion of the shoulder. Assessment as of 3/12/2020:  Pt reporting mild pain and soreness that is intermittent in nature. She has the following supine AROM:  Flex 165 °  abd 160 °, ER 70 °, IR 68 °.   She has the following strength levels: flex 4-/5, abd 4-/5, ER 4-/5, IR 4/5, biceps 4-/5. She continues to be advanced with reps and increased resistance to help her meet her goals of using the arm unrestricted for all ADL's and hobbies. Discontinuation as of 8/13/2020:  Pt was being seen in March 2 times per week until Covid shut down. She was contacted and did not wish to continue with therapy due to risk of infection. Pt felt comfortable working on exercises at home. She had the following ROM measures: flex 165 °, abd 160 °, ER 70 °, IR 68 °. Strength as follows: flex 4-/5, abd 4-/5, ER 4-/5, IR 4/5, biceps 4-/5. Pt was able to return to full work and home duties, but avoided any repetitive overhead lifting or heavy lifting. PROBLEM LIST (Impacting functional limitations):  1. Decreased Strength  2. Decreased ADL/Functional Activities  3. Increased Pain  4. Decreased Flexibility/Joint Mobility INTERVENTIONS PLANNED: (Treatment may consist of any combination of the following)  1. Cold  2. Home Exercise Program (HEP)  3. Manual Therapy  4. Range of Motion (ROM)  5. Therapeutic Exercise/Strengthening     GOALS: (Goals have been discussed and agreed upon with patient.)  Short-Term Functional Goals: Time Frame: 6 weeks  1. Establish independent HEP with no cuing.-met  2. Pt will be able to d/c sling.-met  3. Pt will be able to sleep entire night in her bed.-met  4. Pt will be able to return to work.-met  Discharge Goals: Time Frame: 12 weeks (90 days)  1. Pt will be able to improve score on DASH by at least 6 points for advanced ADL's.-met  2. Pt will be able to gain full active elbow and shoulder ROM for reaching.-in progress  3.  Pt will be able to gain 4/5 to 4+/5 strength for lifting groceries and all household objects with arm.-in progress    OUTCOME MEASURE:   Tool Used: Disabilities of the Arm, Shoulder and Hand (DASH) Questionnaire - Quick Version  Score:  Initial: 30/55  Most Recent:23/55 (Date: 3/12/2020 ) Interpretation of Score: The DASH is designed to measure the activities of daily living in person's with upper extremity dysfunction or pain. Each section is scored on a 1-5 scale, 5 representing the greatest disability. The scores of each section are added together for a total score of 55. Rehabilitation Potential For Stated Goals: Good  Regarding Verlyn Mortimer Chastain's therapy, I certify that the treatment plan above will be carried out by a therapist or under their direction.   Thank you for this referral,  Elio Lindsay, PT

## 2023-06-05 ENCOUNTER — HOSPITAL ENCOUNTER (OUTPATIENT)
Dept: PHYSICAL THERAPY | Age: 53
Setting detail: RECURRING SERIES
Discharge: HOME | End: 2023-06-08
Payer: COMMERCIAL

## 2023-06-05 PROCEDURE — 97110 THERAPEUTIC EXERCISES: CPT

## 2023-06-05 PROCEDURE — 97162 PT EVAL MOD COMPLEX 30 MIN: CPT

## 2023-06-05 NOTE — THERAPY EVALUATION
[place] [facility] [place] . PMH of steroid injections in to the ankle. Rupture of right Achilles tendon, subsequent encounter    Exostosis of right posterior calcaneus    Closed nondisplaced fracture of medial malleolus of right tibia with routine healing, subsequent encounter     PER PT : mostly balance disorder at this time, minimal pain and minimal swelling denoted,  gait abnormality with short stepping,  gastroc weakness denoted with weightbearing and pump leading to decreased lift off and decreased balance. Patient Stated Goal(s):  \"I am ready to have better balance. \"  Initial:     2/10 Post Session:     2/10 foot and ankle. Past Medical History/Comorbidities:   Ms. Domingo Grant  has no past medical history on file. Ms. Domingo Grant  has no past surgical history on file. PER KEHINDE AND ANMED :    Type 2 diabetes mellitus with both eyes affected by proliferative retinopathy and macular edema, without long-term current use of insulin (CMS/HCC) (Primary Dx); Multiple-type hyperlipidemia;   Back pain of lumbar region with sciatica;   Essential (primary) hypertension;   Acute left-sided low back pain with left-sided sciatica; Morbid obesity (CMS/HCC)  Social History/Living Environment:   Type of Home: House       Prior Level of Function/Work/Activity:   Prior level of function: Independent  Occupation: Full time employment         Learning:   Does the patient/guardian have any barriers to learning?: No barriers  Will there be a co-learner?: No  What is the preferred language of the patient/guardian?: English  Is an  required?: No  How does the patient/guardian prefer to learn new concepts?: Pictures/Videos; Demonstration       Fall Risk Scale: Jay Total Score: 0  Jay Fall Risk: Low (0-24)       Dominant Side:  right handed  Personal Factors:        Sex:  female        Age:  52 y.o.       OBJECTIVE     Observation/Postural and Gait Assessment: decreased heel off with gastrocnemius

## 2023-06-05 NOTE — PROGRESS NOTES
and balance. Required minimal visual, verbal, and manual cues to promote proper body alignment, promote proper body posture, and promote proper body mechanics. Progressed resistance, range, and repetitions as indicated. Date:  6/5/23   Activity/Exercise Parameters   SLS  5 x's    Functional strengthening LE  Pumps 10 x's    Education and understanding regarding condition  10 mins                         NuStep  Skilled trunk reciprocal/rotational mobility  Neural coupling (rebuilding/encouraging neural synergy and pathways)  To address muscular imbalance with progressive resistance  Advancement of increased activity tolerance Resistance: level 1   Seat: 15  Duration: 20 mins  Vitals:  Comments:       Treatment/Session Summary:    >Treatment Assessment:  balance more than pain and edema  Communication/Consultation:  Spoke with patient in regards to condition. Equipment provided today:  HEP in chart   Recommendations/Intent for next treatment session: Next visit will focus on balance and stability of the LE and ankle. Zack Avila     >Total Treatment Billable Duration:  40 minutes + EVAL   Time In: 1600  Time Out: [address] [place], [address]       Charge Capture  }Post Session Pain  PT Visit Info  [facility] Portal  MD Guidelines  Scanned Media  Benefits  MyChart    Future Appointments   Date Time Provider Edy Bryant   6/13/2023  4:00  [address], [address] [facility]   6/16/2023  8:00 AM Glory Sheridan PTA [facility]   6/20/2023  4:00  [address], PT [facility]   6/23/2023  8:00 AM Glory Sheridan PTA [facility]   6/27/2023  8:00 AM Glory Sheridan PTA [facility]   6/29/2023  8:00 AM Glory Sheridan PTA [facility]   7/3/2023  8:00 AM Glory Sheridan PTA [facility]   7/6/2023  4:00  [address], PT [facility]

## 2023-06-06 ASSESSMENT — PAIN SCALES - GENERAL: PAINLEVEL_OUTOF10: 2

## 2023-06-16 ENCOUNTER — APPOINTMENT (OUTPATIENT)
Dept: PHYSICAL THERAPY | Age: 53
End: 2023-06-16
Payer: COMMERCIAL

## 2023-06-20 ENCOUNTER — HOSPITAL ENCOUNTER (OUTPATIENT)
Dept: PHYSICAL THERAPY | Age: 53
Setting detail: RECURRING SERIES
Discharge: HOME | End: 2023-06-23
Payer: COMMERCIAL

## 2023-06-20 PROCEDURE — 97016 VASOPNEUMATIC DEVICE THERAPY: CPT

## 2023-06-20 PROCEDURE — 97110 THERAPEUTIC EXERCISES: CPT

## 2023-06-20 ASSESSMENT — PAIN SCALES - GENERAL: PAINLEVEL_OUTOF10: 2

## 2023-06-20 NOTE — PROGRESS NOTES
from 6/5/23   Treatment   THERAPEUTIC EXERCISE: (44 minutes):    Exercises per grid below to improve mobility, strength, and balance. Required minimal visual, verbal, and manual cues to promote proper body alignment, promote proper body posture, and promote proper body mechanics. Progressed resistance, range, and repetitions as indicated. Date:  6/13/23   Activity/Exercise Parameters   SLS on foam with minimal UE support  10 x's    Functional strengthening LE  Pumps 10 x's    Supine DF/EVER/INVERSION/PF  Concurrent with VSPC 15 mins    Incline stretch (gentle )   5 mins    Ambulation with push off training  100 feet    Supine isometrics all planes DF/PF/INV/EVERSION  10 x's         NuStep  Skilled trunk reciprocal/rotational mobility  Neural coupling (rebuilding/encouraging neural synergy and pathways)  To address muscular imbalance with progressive resistance  Advancement of increased activity tolerance Resistance: level 1   Seat: 15  Duration: 14 mins  Vitals:  Comments:        Body Part: Right   Edema Measurements: Involved - Pre: 54 cm , Post: 53 cm  Figure 8 measurement   Uninvolved -  53 cm   Effects of Edema on Function: Decreased swelling in the ankle . Comments:        Treatment/Session Summary:    >Treatment Assessment:  Patient tolerated session well with minimal pain and presented with good strength during ankle isometrics  Communication/Consultation:  Spoke with patient in regards to condition.   Equipment provided today:  HEP in chart   Recommendations/Intent for next treatment session: Next visit will focus on balance and stability of the LE and ankle and right ankle edema with VSPC .    >Total Treatment Billable Duration:  44 (and vasopneumatic compression )  Time In: 1558  Time Out: [address], [facility]       Charge Capture  }Post Session Pain  PT Visit Info  [facility] Portal  MD [address]    Future Appointments   Date Time Provider Edy Bryant

## 2023-06-23 ENCOUNTER — HOSPITAL ENCOUNTER (OUTPATIENT)
Dept: PHYSICAL THERAPY | Age: 53
Setting detail: RECURRING SERIES
Discharge: HOME | End: 2023-06-26
Payer: COMMERCIAL

## 2023-06-23 PROCEDURE — 97110 THERAPEUTIC EXERCISES: CPT

## 2023-06-23 ASSESSMENT — PAIN SCALES - GENERAL: PAINLEVEL_OUTOF10: 4

## 2023-06-27 ENCOUNTER — HOSPITAL ENCOUNTER (OUTPATIENT)
Dept: PHYSICAL THERAPY | Age: 53
Setting detail: RECURRING SERIES
Discharge: HOME | End: 2023-06-30
Payer: COMMERCIAL

## 2023-06-27 PROCEDURE — 97110 THERAPEUTIC EXERCISES: CPT

## 2023-06-27 ASSESSMENT — PAIN SCALES - GENERAL: PAINLEVEL_OUTOF10: 4

## 2023-06-29 ENCOUNTER — HOSPITAL ENCOUNTER (OUTPATIENT)
Dept: PHYSICAL THERAPY | Age: 53
Setting detail: RECURRING SERIES
End: 2023-06-29
Payer: COMMERCIAL

## 2023-06-29 PROCEDURE — 97016 VASOPNEUMATIC DEVICE THERAPY: CPT

## 2023-06-29 PROCEDURE — 97110 THERAPEUTIC EXERCISES: CPT

## 2023-06-29 ASSESSMENT — PAIN SCALES - GENERAL: PAINLEVEL_OUTOF10: 6

## 2023-07-03 ENCOUNTER — HOSPITAL ENCOUNTER (OUTPATIENT)
Dept: PHYSICAL THERAPY | Age: 53
Setting detail: RECURRING SERIES
Discharge: HOME | End: 2023-07-06
Payer: COMMERCIAL

## 2023-07-03 PROCEDURE — 97110 THERAPEUTIC EXERCISES: CPT

## 2023-07-03 ASSESSMENT — PAIN SCALES - GENERAL: PAINLEVEL_OUTOF10: 7

## 2023-07-03 NOTE — PROGRESS NOTES
This PT called and left message for [facility] in Dr. Diana Chan office as a courtesy request from Mrs. Godoy,  regarding the dorsum of the affected right foot with exquisite pain and tenderness after doing yard work and trail walking over the weekend. She has a history of foot stress fracture several years ago which required a bone stimulator complete union of the fracture of the foot. She reports this pain is similar. Recommend consultation with Orthopedic specialist regarding further imaging and/or work up of condition. PT to follow up with patient at next visit. Patient called after message left for CMA with update. Stone Baron, PT, DPT, OCS.

## 2023-07-03 NOTE — PROGRESS NOTES
Jill Nuñez  : 1970  Primary: Kedar Singh ([facility])  Secondary:  [address] [facility]  [address] [facility] [place] [address]  Phone: [phone]  Fax: [phone]    Plan of Care/Certification Expiration Date: 23      >PT Visit Info:  Plan of Care/Certification Expiration Date: 23  Total # of Visits Approved: 100  Total # of Visits to Date: 3  Progress Note Counter: 3  Progress Note Due Date:  (by 10th visit)      Visit Count:  7    OUTPATIENT PHYSICAL THERAPY:OP NOTE TYPE: Treatment Note 7/3/2023       Episode  }Appt Desk       Achilles insufficiency       Treatment Diagnosis:  Difficulty in walking, Not elsewhere classified (R26.2)  Other abnormalities of gait and mobility (R26.89)  Medical/Referring Diagnosis:  Calcaneal spur, right foot [M77.31]  Strain of right Achilles tendon, subsequent encounter [S86.011D]  Nondisplaced fracture of medial malleolus of right tibia, subsequent encounter for closed fracture with routine healing [S82.54XD]  Referring Physician:  Yomaira Laurent MD MD Orders:  PT Eval and Treat   Date of Onset:  Onset Date: 22     Allergies:   Patient has no allergy information on record. Restrictions/Precautions:  No data recordedNo data recorded     Interventions Planned (Treatment may consist of any combination of the following):    Current Treatment Recommendations: -- (1. Balance Exercise  2. Gait Training  3. Home Exercise Program (HEP)  4. Neuromuscular Re-education/Strengthening  5Therapeutic Activites  6. Therapeutic Exercise/Strengthening   8. Unattended ESTIM   9. Manual Therapy 13. Aquatic Therapy)       >Subjective Comments:  Patient reports foot has been really hurting since last friday. She states it feels like it did when she had a stress fracture. >Initial:     7/10>Post Session:       5/10 in the plantar surface of the R foot.      Medications Last Reviewed:  7/3/2023  Updated Objective Findings:  None

## 2023-07-06 ENCOUNTER — HOSPITAL ENCOUNTER (OUTPATIENT)
Dept: PHYSICAL THERAPY | Age: 53
Setting detail: RECURRING SERIES
End: 2023-07-06
Payer: COMMERCIAL